# Patient Record
Sex: MALE | Race: WHITE | NOT HISPANIC OR LATINO | Employment: OTHER | URBAN - METROPOLITAN AREA
[De-identification: names, ages, dates, MRNs, and addresses within clinical notes are randomized per-mention and may not be internally consistent; named-entity substitution may affect disease eponyms.]

---

## 2017-04-24 ENCOUNTER — HISTORICAL (OUTPATIENT)
Dept: LAB | Facility: HOSPITAL | Age: 57
End: 2017-04-24

## 2018-03-15 ENCOUNTER — HISTORICAL (OUTPATIENT)
Dept: LAB | Facility: HOSPITAL | Age: 58
End: 2018-03-15

## 2018-05-14 ENCOUNTER — HISTORICAL (OUTPATIENT)
Dept: ADMINISTRATIVE | Facility: HOSPITAL | Age: 58
End: 2018-05-14

## 2019-06-20 ENCOUNTER — HISTORICAL (OUTPATIENT)
Dept: ADMINISTRATIVE | Facility: HOSPITAL | Age: 59
End: 2019-06-20

## 2019-06-20 LAB
ALBUMIN SERPL-MCNC: 3.9 GM/DL (ref 3.4–5)
ALBUMIN/GLOB SERPL: 1.56 {RATIO} (ref 1.5–2.5)
ALP SERPL-CCNC: 53 UNIT/L (ref 38–126)
ALT SERPL-CCNC: 13 UNIT/L (ref 7–52)
APPEARANCE, UA: ABNORMAL
AST SERPL-CCNC: 15 UNIT/L (ref 15–37)
BACTERIA #/AREA URNS AUTO: ABNORMAL /HPF
BILIRUB SERPL-MCNC: 0.7 MG/DL (ref 0.2–1)
BILIRUB UR QL STRIP: NEGATIVE MG/DL
BILIRUBIN DIRECT+TOT PNL SERPL-MCNC: 0.2 MG/DL (ref 0–0.5)
BILIRUBIN DIRECT+TOT PNL SERPL-MCNC: 0.5 MG/DL
BUN SERPL-MCNC: 11 MG/DL (ref 7–18)
CALCIUM SERPL-MCNC: 8.8 MG/DL (ref 8.5–10)
CHLORIDE SERPL-SCNC: 105 MMOL/L (ref 98–107)
CHOLEST SERPL-MCNC: 136 MG/DL (ref 0–200)
CHOLEST/HDLC SERPL: 2.8 {RATIO}
CO2 SERPL-SCNC: 30 MMOL/L (ref 21–32)
COLOR UR: YELLOW
CREAT SERPL-MCNC: 0.58 MG/DL (ref 0.6–1.3)
DEPRECATED CALCIDIOL+CALCIFEROL SERPL-MC: 46.7 NG/ML (ref 30–80)
EST. AVERAGE GLUCOSE BLD GHB EST-MCNC: 114 MG/DL
GLOBULIN SER-MCNC: 2.5 GM/DL (ref 1.2–3)
GLUCOSE (UA): NEGATIVE MG/DL
GLUCOSE SERPL-MCNC: 115 MG/DL (ref 74–106)
HBA1C MFR BLD: 5.6 % (ref 4.4–6.4)
HDLC SERPL-MCNC: 48 MG/DL (ref 35–60)
HGB UR QL STRIP: NEGATIVE UNIT/L
KETONES UR QL STRIP: NEGATIVE MG/DL
LDLC SERPL CALC-MCNC: 77 MG/DL (ref 0–129)
LEUKOCYTE ESTERASE UR QL STRIP: NEGATIVE UNIT/L
NITRITE UR QL STRIP.AUTO: NEGATIVE
PH UR STRIP: 6.5 [PH]
POTASSIUM SERPL-SCNC: 4 MMOL/L (ref 3.5–5.1)
PROT SERPL-MCNC: 6.4 GM/DL (ref 6.4–8.2)
PROT UR QL STRIP: NEGATIVE MG/DL
RBC #/AREA URNS HPF: ABNORMAL /HPF
SODIUM SERPL-SCNC: 140 MMOL/L (ref 136–145)
SP GR UR STRIP: 1.02
SQUAMOUS EPITHELIAL, UA: ABNORMAL /LPF
TRIGL SERPL-MCNC: 60 MG/DL (ref 30–150)
TSH SERPL-ACNC: 1.2 MIU/ML (ref 0.35–4.94)
UROBILINOGEN UR STRIP-ACNC: 1 MG/DL
VLDLC SERPL CALC-MCNC: 12 MG/DL
WBC #/AREA URNS AUTO: ABNORMAL /[HPF]

## 2022-04-07 ENCOUNTER — HISTORICAL (OUTPATIENT)
Dept: ADMINISTRATIVE | Facility: HOSPITAL | Age: 62
End: 2022-04-07

## 2022-04-24 VITALS
BODY MASS INDEX: 44.1 KG/M2 | HEIGHT: 71 IN | OXYGEN SATURATION: 92 % | DIASTOLIC BLOOD PRESSURE: 68 MMHG | SYSTOLIC BLOOD PRESSURE: 146 MMHG | WEIGHT: 315 LBS

## 2022-05-01 NOTE — HISTORICAL [facility] CERNER
This is a historical note converted from Silvia. Formatting and pictures may have been removed.  Please reference Silvia for original formatting and attached multimedia. Chief Complaint  6 month recheck  History of Present Illness  58 year old Morbidly obese WM presents for 6 month recheck  PMH: Morbid Obesity (BMI 57.16)- s/p Gastric Sleeve, HLD, HTN, DM, OA, Hx PUD,???? ?DDD(L-spine)  ?   , Disabled  No Tob, No EtOH  ?   Colonoscopy (2017) with Dr Bangura  ?   Patient still with lower back pain, although much improved since having LESIs last year and undergoing a year of PT. However, he is still having to sleep in his chair at night. He can not lie flat as his legs start hurting and burning. Has also tried using a wedge under his legs which didnt help.  Review of Systems  Constitutional:?no fever, fatigue, weakness  Eye:?no vision loss, eye redness, drainage, or pain  ENMT:?no sore throat, ear pain, sinus pain/congestion, nasal congestion/drainage  Respiratory:?no cough, no wheezing, no shortness of breath  Cardiovascular:?no chest pain, no palpitations, no edema  Gastrointestinal:?no nausea, vomiting, or diarrhea. No abdominal pain  Genitourinary:?no dysuria, no urinary frequency or urgency, no hematuria  Hema/Lymph:?no abnormal bruising or bleeding  Endocrine:?no heat or cold intolerance, no excessive thirst or excessive urination  Musculoskeletal:?chronic low back pain and LE weakness  Integumentary:?no skin rash or abnormal lesion  Neurologic: no headache, no dizziness, no weakness or numbness  ?  Physical Exam  Vitals & Measurements  T:?36.7? ?C (Oral)? HR:?86(Peripheral)? BP:?141/88? SpO2:?92%?  HT:?180?cm? WT:?178.4?kg? BMI:?55.06?  General:?well-developed well-nourished in no acute distress  Eye: PERRLA, EOMI, clear conjunctiva, eyelids normal  HENT:?TMs/ear canals clear, oropharynx without erythema/exudate, oropharynx and nasal mucosal surfaces moist, no maxillary/frontal sinus tenderness to  palpation  Neck: full range of motion, no thyromegaly or lymphadenopathy  Respiratory:?clear to auscultation bilaterally  Cardiovascular:?regular rate and rhythm without murmurs, gallops or rubs  Gastrointestinal:?soft, non-tender, non-distended with normal bowel sounds, without masses to palpation  Genitourinary: no CVA tenderness to palpation  Musculoskeletal:?Limited ROM to bilateral knees  Integumentary: no rashes or skin lesions present  Neurologic: cranial nerves intact, no signs of peripheral neurological deficit, motor/sensory function intact  ?  Assessment/Plan  1.?Degenerative disc disease?M51.9  ?s/p LESI with Dr Mcneil. Received 4 shots. Most recent 8/2018  ?Also saw Neurosurgeon (Iban Meyer @ [facility]). Offered surgery (L2-L5 fusion) but would rather hold off if necessary and continue with therapy  Ordered:  Lab Collection Request, 06/20/19 9:39:00 CDT, [facility] AM[facility] - [facility], 06/20/19 9:39:00 CDT  Office/Outpatient Visit Level 4 Established 19427 PC, GERD - Gastro-esophageal reflux disease  Hypertension  Lumbar radiculopathy  Type 2 diabetes mellitus  Degenerative disc disease, [facility] - [facility], 06/20/19 9:39:00 CDT  ?  2.?GERD - Gastro-esophageal reflux disease?K21.9  Continue Omeprazole 40mg PO q day  Ordered:  Office/Outpatient Visit Level 4 Established 89166 PC, GERD - Gastro-esophageal reflux disease  Hypertension  Lumbar radiculopathy  Type 2 diabetes mellitus  Degenerative disc disease, [facility] - [facility], 06/20/19 9:39:00 CDT  ?  3.?Hypertension?I10  Continue?Valsartan/HCTZ 160/25mg PO q day  Ordered:  Comprehensive Metabolic Panel, Now collect, 06/20/19 9:39:00 CDT, Blood, Order for future visit, Stop date 06/20/19 9:39:00 CDT, Lab Collect, Hypertension, 06/20/19 9:39:00 CDT  Office/Outpatient Visit Level 4 Established 98939 PC, GERD - Gastro-esophageal reflux disease  Hypertension  Lumbar radiculopathy  Type 2 diabetes mellitus  Degenerative disc disease, [facility] AM[facility]  - AFP, 06/20/19 9:39:00 CDT  ?  4.?Lumbar radiculopathy?M54.16  ?Rx given for Hospital bed  Ordered:  Office/Outpatient Visit Level 4 Established 10412 PC, GERD - Gastro-esophageal reflux disease  Hypertension  Lumbar radiculopathy  Type 2 diabetes mellitus  Degenerative disc disease, [facility] - [facility], 06/20/19 9:39:00 CDT  ?  5.?Obstructive sleep apnea of adult?G47.33  Continue with CPAP  Patient compliant and wears mask nightly. Symptoms improved  ?  6.?Type 2 diabetes mellitus?E11.9  ?Continue Metformin 500mg PO BID  ?CMP, A1C  Ordered:  Hemoglobin A1c, Routine collect, 06/20/19 9:39:00 CDT, Blood, Order for future visit, Stop date 06/20/19 9:39:00 CDT, Lab Collect, Type 2 diabetes mellitus, 06/20/19 9:39:00 CDT  Office/Outpatient Visit Level 4 Established 95012 PC, GERD - Gastro-esophageal reflux disease  Hypertension  Lumbar radiculopathy  Type 2 diabetes mellitus  Degenerative disc disease, [facility] AM[facility] - [facility], 06/20/19 9:39:00 CDT  ?  7.?HLD (hyperlipidemia)?E78.5  ?Continue Atorvastatin 80mg PO q day  ?FLP  Ordered:  Lipid Panel, Routine collect, 06/20/19 9:39:00 CDT, Blood, Order for future visit, Stop date 06/20/19 9:39:00 CDT, Lab Collect, HLD (hyperlipidemia), 06/20/19 9:39:00 CDT  Thyroid Stimulating Hormone, Routine collect, 06/20/19 9:39:00 CDT, Blood, Order for future visit, Stop date 06/20/19 9:39:00 CDT, Lab Collect, HLD (hyperlipidemia), 06/20/19 9:39:00 CDT  ?   Problem List/Past Medical History  Ongoing  Degenerative disc disease  GERD - Gastro-esophageal reflux disease  Hypertension  Impingement syndrome of right shoulder  Lumbar radiculopathy  Obesity  Obstructive sleep apnea of adult  Osteoarthritis  Type 2 diabetes mellitus  Historical  Avenel palsy  Diabetes  Hemorrhoid  Meningitis  Prostatitis  Renal failure  Restless leg syndrome  Stroke  Procedure/Surgical History  Colonoscopy (04/27/2018)  Gastric Sleeve Laparoscopic (.) (09/15/2015)  Laparoscopic Vertical (Sleeve) Gastrectomy  (09/15/2015)  Colonoscopy (07/09/2013)  Colonoscopy (07/09/2013)  Other endoscopy of small intestine (07/08/2013)  Flexible sigmoidoscopy (07/05/2013)  Other endoscopy of small intestine (07/05/2013)  Venous catheterization, not elsewhere classified (07/05/2013)  double hernia repair  hemorrhoid banding  tonsillectomy   Medications  Anusol-HC 25 mg rectal suppository, 25 mg= 1 supp, TN (rectal), BID  atorvastatin 80 mg oral tablet, 80 mg= 1 tab(s), Oral, Daily  celecoxib 100 mg oral capsule, 100 mg= 1 cap(s), Oral, BID, 1 refills  Klor-Con M20 oral tablet, extended release, 20 mEq= 1 tab(s), Oral, Daily, 1 refills  metformin 500 mg oral tablet, 500 mg= 1 tab(s), Oral, BID, 1 refills  omeprazole 40 mg oral DR capsule, 40 mg= 1 cap(s), Oral, BID, 2 refills  VALSARTAN-HCTZ 160-25 MG TAB, 1 tab(s), Oral, Daily  Vitamin D 1000 intl units oral tablet, 1 tab, Oral, Daily  Vitamin D 50,000 intl units oral capsule, 37956 IntUnit= 1 cap(s), Oral, qMon-Sa  Allergies  No Known Allergies  Social History  Abuse/Neglect  No, 06/20/2019  Alcohol - Denies Alcohol Use, 07/03/2013  Past, 03/15/2018  Employment/School - Not employed or in school, 07/03/2013  Unemployed, 12/20/2018  Exercise - Regular exercise, 07/03/2013  Home/Environment - No Risk, 07/03/2013  Lives with Spouse., 12/20/2018  Nutrition/Health  Regular, 12/20/2018  Substance Use - Low Risk, 07/03/2013  Tobacco - Denies Tobacco Use, 07/03/2013  Never (less than 100 in lifetime), No, 06/20/2019  Former smoker, quit more than 30 days ago, N/A, 04/05/2019  Former smoker, quit more than 30 days ago, N/A, 01/10/2019  Family History  Alcoholism.: Mother and Father.  Asthma.: Mother.  Hypertension.: Father.  Primary malignant neoplasm of colon: Mother and Father.  Skin cancer: Father.  Immunizations  Vaccine Date Status   pneumococcal 13-valent conjugate vaccine 09/26/2018 Given   influenza virus vaccine, inactivated 09/26/2018 Given   [facility]  Maintenance  ???Pending?(in the next year)  ??? ??Due?  ??? ? ? ?Aspirin Therapy for CVD Prevention due??06/20/19??and every 1??year(s)  ??? ? ? ?Diabetes Maintenance-Eye Exam due??06/20/19??and every?  ??? ? ? ?Diabetes Maintenance-Foot Exam due??06/20/19??and every?  ??? ? ? ?Tetanus Vaccine due??06/20/19??and every 10??year(s)  ??? ??Due In Future?  ??? ? ? ?Diabetes Maintenance-Fasting Lipid Profile not due until??12/20/19??and every 1??year(s)  ??? ? ? ?Diabetes Maintenance-HgbA1c not due until??12/20/19??and every 1??year(s)  ??? ? ? ?Hypertension Management-BMP not due until??12/20/19??and every 1??year(s)  ??? ? ? ?Diabetes Maintenance-Serum Creatinine not due until??12/20/19??and every 1??year(s)  ??? ? ? ?Diabetes Maintenance-Urine Dipstick not due until??12/20/19??and every 1??year(s)  ??? ? ? ?Diabetes Maintenance-Microalbumin not due until??12/20/19??and every 1??year(s)  ??? ? ? ?Alcohol Misuse Screening not due until??01/01/20??and every 1??year(s)  ??? ? ? ?Obesity Screening not due until??01/01/20??and every 1??year(s)  ??? ? ? ?Depression Screening not due until??04/04/20??and every 1??year(s)  ??? ? ? ?ADL Screening not due until??04/05/20??and every 1??year(s)  ??? ? ? ?Blood Pressure Screening not due until??06/19/20??and every 1??year(s)  ??? ? ? ?Body Mass Index Check not due until??06/19/20??and every 1??year(s)  ??? ? ? ?Hypertension Management-Blood Pressure not due until??06/19/20??and every 1??year(s)  ???Satisfied?(in the past 1 year)  ??? ??Satisfied?  ??? ? ? ?ADL Screening on??04/05/19.??Satisfied by Khadra Melgar LPN  ??? ? ? ?Alcohol Misuse Screening on??04/05/19.??Satisfied by Khadra Melgar LPN  ??? ? ? ?Blood Pressure Screening on??06/20/19.??Satisfied by Daija Cullen CMA  ??? ? ? ?Body Mass Index Check on??06/20/19.??Satisfied by Daija Cullen CMA  ??? ? ? ?Depression Screening on??04/05/19.??Satisfied by Khadra Melgar LPN  ??? ? ? ?Diabetes  Maintenance-Microalbumin on??12/20/18.??Satisfied by [place]  ??? ? ? ?Diabetes Maintenance-Fasting Lipid Profile on??12/20/18.??Satisfied by Alysha Rodriguez  ??? ? ? ?Diabetes Maintenance-HgbA1c on??12/20/18.??Satisfied by Alysha Rodriguez  ??? ? ? ?Diabetes Maintenance-Serum Creatinine on??12/20/18.??Satisfied by Alysha Rodriguez  ??? ? ? ?Diabetes Maintenance-Urine Dipstick on??12/20/18.??Satisfied by [place]  ??? ? ? ?Diabetes Screening on??12/20/18.??Satisfied by Alysha Rodriguez  ??? ? ? ?Hypertension Management-Blood Pressure on??06/20/19.??Satisfied by Daija Cullen CMA.  ??? ? ? ?Hypertension Management-BMP on??12/20/18.??Satisfied by [place]  ??? ? ? ?Influenza Vaccine on??09/26/18.??Satisfied by Khadra Meglar LPN  ??? ? ? ?Lipid Screening on??12/20/18.??Satisfied by Alysha Rodriguez  ??? ? ? ?Obesity Screening on??06/20/19.??Satisfied by Daija Cullen CMA.  ?  ?      Morbid Obesity with inability to lie flat due to lower back issues.  Therefore, patient will need an Electronic Hospital Bed  Due to his excessive wt, he also needs a new shower chair as his current chair does not support his wt limit. He will require a Standard Heavy Duty Shower cahir with 500lb max  Due to obesity and back issues, he also has difficulty bending over in shower to properly clean perineum. Therefore, he would benefit from [facility] Potty cover seat

## 2022-05-26 PROBLEM — G89.29 BILATERAL CHRONIC KNEE PAIN: Status: ACTIVE | Noted: 2022-05-26

## 2022-05-26 PROBLEM — G56.00 CTS (CARPAL TUNNEL SYNDROME): Status: ACTIVE | Noted: 2022-05-26

## 2022-05-26 PROBLEM — R73.03 PREDIABETES: Status: ACTIVE | Noted: 2022-05-26

## 2022-05-26 PROBLEM — M75.40 IMPINGEMENT SYNDROME OF SHOULDER REGION: Status: ACTIVE | Noted: 2022-05-26

## 2022-05-26 PROBLEM — D50.9 IRON DEFICIENCY ANEMIA: Status: ACTIVE | Noted: 2022-05-26

## 2022-05-26 PROBLEM — K21.9 GASTROESOPHAGEAL REFLUX DISEASE: Status: ACTIVE | Noted: 2022-05-26

## 2022-05-26 PROBLEM — M19.90 OSTEOARTHRITIS: Status: ACTIVE | Noted: 2022-05-26

## 2022-05-26 PROBLEM — M25.562 BILATERAL CHRONIC KNEE PAIN: Status: ACTIVE | Noted: 2022-05-26

## 2022-05-26 PROBLEM — E55.9 VITAMIN D DEFICIENCY: Status: ACTIVE | Noted: 2022-05-26

## 2022-05-26 PROBLEM — Z12.5 PROSTATE CANCER SCREENING: Status: ACTIVE | Noted: 2022-05-26

## 2022-05-26 PROBLEM — G47.33 OBSTRUCTIVE SLEEP APNEA SYNDROME IN ADULT: Status: ACTIVE | Noted: 2022-05-26

## 2022-05-26 PROBLEM — E78.5 HYPERLIPIDEMIA: Status: ACTIVE | Noted: 2022-05-26

## 2022-05-26 PROBLEM — Z00.00 MEDICARE ANNUAL WELLNESS VISIT, SUBSEQUENT: Status: ACTIVE | Noted: 2022-05-26

## 2022-05-26 PROBLEM — M54.16 LUMBAR RADICULOPATHY: Status: ACTIVE | Noted: 2022-05-26

## 2022-05-26 PROBLEM — M25.561 BILATERAL CHRONIC KNEE PAIN: Status: ACTIVE | Noted: 2022-05-26

## 2022-05-26 PROBLEM — I10 HYPERTENSION: Status: ACTIVE | Noted: 2022-05-26

## 2022-05-26 PROCEDURE — 82728 ASSAY OF FERRITIN: CPT | Performed by: NURSE PRACTITIONER

## 2022-05-26 PROCEDURE — 83540 ASSAY OF IRON: CPT | Performed by: NURSE PRACTITIONER

## 2022-12-13 ENCOUNTER — PATIENT OUTREACH (OUTPATIENT)
Dept: ADMINISTRATIVE | Facility: HOSPITAL | Age: 62
End: 2022-12-13
Payer: MEDICARE

## 2023-03-20 ENCOUNTER — IMMUNIZATION (OUTPATIENT)
Dept: FAMILY MEDICINE | Facility: CLINIC | Age: 63
End: 2023-03-20
Payer: MEDICARE

## 2023-03-20 DIAGNOSIS — Z23 NEED FOR VACCINATION: Primary | ICD-10-CM

## 2023-03-20 PROCEDURE — 0124A COVID-19, MRNA, LNP-S, BIVALENT BOOSTER, PF, 30 MCG/0.3 ML DOSE: CPT | Mod: S$GLB,,, | Performed by: INTERNAL MEDICINE

## 2023-03-20 PROCEDURE — 91312 COVID-19, MRNA, LNP-S, BIVALENT BOOSTER, PF, 30 MCG/0.3 ML DOSE: CPT | Mod: S$GLB,,, | Performed by: INTERNAL MEDICINE

## 2023-03-20 PROCEDURE — 91312 COVID-19, MRNA, LNP-S, BIVALENT BOOSTER, PF, 30 MCG/0.3 ML DOSE: ICD-10-PCS | Mod: S$GLB,,, | Performed by: INTERNAL MEDICINE

## 2023-03-20 PROCEDURE — 0124A COVID-19, MRNA, LNP-S, BIVALENT BOOSTER, PF, 30 MCG/0.3 ML DOSE: ICD-10-PCS | Mod: S$GLB,,, | Performed by: INTERNAL MEDICINE

## 2023-04-12 ENCOUNTER — LAB VISIT (OUTPATIENT)
Dept: FAMILY MEDICINE | Facility: CLINIC | Age: 63
End: 2023-04-12
Payer: MEDICARE

## 2023-04-12 DIAGNOSIS — Z11.52 ENCOUNTER FOR SCREENING FOR COVID-19: Primary | ICD-10-CM

## 2023-04-12 LAB
CTP QC/QA: YES
SARS-COV-2 AG RESP QL IA.RAPID: POSITIVE

## 2023-04-12 NOTE — PROGRESS NOTES
Nasal swab collected for rapid covid test. Pt tolerated well. Notified of positive results. Pt verbalized understanding

## 2023-04-17 ENCOUNTER — LAB VISIT (OUTPATIENT)
Dept: FAMILY MEDICINE | Facility: CLINIC | Age: 63
End: 2023-04-17
Payer: MEDICARE

## 2023-04-17 DIAGNOSIS — Z11.52 ENCOUNTER FOR SCREENING FOR COVID-19: Primary | ICD-10-CM

## 2023-04-17 LAB
CTP QC/QA: YES
SARS-COV-2 AG RESP QL IA.RAPID: POSITIVE

## 2023-04-21 ENCOUNTER — LAB VISIT (OUTPATIENT)
Dept: FAMILY MEDICINE | Facility: CLINIC | Age: 63
End: 2023-04-21
Payer: MEDICARE

## 2023-04-21 DIAGNOSIS — Z11.52 ENCOUNTER FOR SCREENING FOR COVID-19: Primary | ICD-10-CM

## 2023-04-21 LAB
CTP QC/QA: YES
SARS-COV-2 AG RESP QL IA.RAPID: POSITIVE

## 2023-04-24 ENCOUNTER — LAB VISIT (OUTPATIENT)
Dept: FAMILY MEDICINE | Facility: CLINIC | Age: 63
End: 2023-04-24
Payer: MEDICARE

## 2023-04-24 DIAGNOSIS — Z11.52 ENCOUNTER FOR SCREENING FOR COVID-19: Primary | ICD-10-CM

## 2023-04-24 LAB
CTP QC/QA: YES
SARS-COV-2 AG RESP QL IA.RAPID: NEGATIVE

## 2023-04-24 PROCEDURE — 87811 SARS CORONAVIRUS 2 ANTIGEN POCT, MANUAL READ: ICD-10-PCS | Mod: QW,S$GLB,, | Performed by: INTERNAL MEDICINE

## 2023-04-24 PROCEDURE — 87811 SARS-COV-2 COVID19 W/OPTIC: CPT | Mod: QW,S$GLB,, | Performed by: INTERNAL MEDICINE

## 2023-04-24 NOTE — PROGRESS NOTES
Rapid Covid Swab performed and tolerated well. Patient waited for results and verbally understood results.

## 2023-05-05 ENCOUNTER — ANESTHESIA EVENT (OUTPATIENT)
Dept: ENDOSCOPY | Facility: HOSPITAL | Age: 63
End: 2023-05-05
Payer: MEDICARE

## 2023-05-05 RX ORDER — SODIUM CHLORIDE, SODIUM GLUCONATE, SODIUM ACETATE, POTASSIUM CHLORIDE AND MAGNESIUM CHLORIDE 30; 37; 368; 526; 502 MG/100ML; MG/100ML; MG/100ML; MG/100ML; MG/100ML
INJECTION, SOLUTION INTRAVENOUS CONTINUOUS
Status: CANCELLED | OUTPATIENT
Start: 2023-05-05 | End: 2023-06-04

## 2023-05-05 RX ORDER — SODIUM CITRATE AND CITRIC ACID MONOHYDRATE 334; 500 MG/5ML; MG/5ML
30 SOLUTION ORAL ONCE
Status: CANCELLED | OUTPATIENT
Start: 2023-05-08

## 2023-05-05 RX ORDER — FAMOTIDINE 20 MG/50ML
20 INJECTION, SOLUTION INTRAVENOUS ONCE
Status: CANCELLED | OUTPATIENT
Start: 2023-05-08

## 2023-05-05 RX ORDER — ONDANSETRON 2 MG/ML
4 INJECTION INTRAMUSCULAR; INTRAVENOUS ONCE AS NEEDED
Status: CANCELLED | OUTPATIENT
Start: 2023-05-05 | End: 2034-10-01

## 2023-05-05 NOTE — ANESTHESIA PREPROCEDURE EVALUATION
05/05/2023  Stalin Perkins is a 63 y.o., male here for colon screen  Pre-operative evaluation for Procedure(s) (LRB):  COLON (N/A)    Stalin Perkins is a 63 y.o. male     Patient Active Problem List   Diagnosis    Degeneration of intervertebral disc    Gastroesophageal reflux disease    Hyperlipidemia    Hypertension    Impingement syndrome of shoulder region    Lumbar radiculopathy    Obstructive sleep apnea syndrome    Osteoarthritis    Prediabetes    Vitamin D deficiency    Prostate cancer screening    CTS (carpal tunnel syndrome)    Iron deficiency anemia    Bilateral chronic knee pain       Review of patient's allergies indicates:  No Known Allergies    No current facility-administered medications on file prior to encounter.     Current Outpatient Medications on File Prior to Encounter   Medication Sig Dispense Refill    albuterol (PROAIR HFA) 90 mcg/actuation inhaler Inhale 2 puffs into the lungs every 6 (six) hours as needed for Wheezing. Rescue 18 g 1    albuterol-ipratropium (DUO-NEB) 2.5 mg-0.5 mg/3 mL nebulizer solution Take 3 mLs by nebulization every 6 (six) hours as needed for Wheezing. Rescue 100 each 1    atorvastatin (LIPITOR) 80 MG tablet Take 80 mg by mouth once daily.      b complex vitamins tablet Take 1 tablet by mouth once daily.      blood sugar diagnostic Strp To check BG TID PRN, to use with OneTouch glucometer 100 each 3    carvediloL (COREG) 6.25 MG tablet Take 1 tablet (6.25 mg total) by mouth 2 (two) times a day. 180 tablet 1    cyanocobalamin (VITAMIN B-12) 100 MCG tablet Take 100 mcg by mouth once daily.      ergocalciferol (ERGOCALCIFEROL) 50,000 unit Cap Take 1 capsule (50,000 Units total) by mouth twice a week. 24 capsule 1    Lactobacillus acidophilus (PROBIOTIC) 10 billion cell Cap Take by mouth.      lancets Misc To check BG TID  PRN, to use with OneTouch glucometer 100 each 3    metFORMIN (GLUCOPHAGE) 500 MG tablet Take 1 tablet (500 mg total) by mouth 2 (two) times a day. 180 tablet 3    mineral oil/petrolatum,white (LACRI-LUBE S.O.P. OPHT) Apply 1 drop to eye 4 (four) times daily as needed.      mupirocin (BACTROBAN) 2 % ointment Apply topically 3 (three) times daily. 30 g 2    omeprazole (PRILOSEC) 40 MG capsule Take 1 capsule (40 mg total) by mouth every morning. 90 capsule 3    polyethylene glycol (GLYCOLAX) 17 gram/dose powder Take 17 g by mouth once daily.      potassium chloride SA (K-DUR,KLOR-CON) 20 MEQ tablet Take 20 mEq by mouth once daily.      silver sulfADIAZINE 1% (SILVADENE) 1 % cream Apply 1 g topically 2 (two) times daily. 85 g 2    valsartan-hydrochlorothiazide (DIOVAN-HCT) 320-25 mg per tablet Take 1 tablet by mouth Daily. 90 tablet 3       Past Surgical History:   Procedure Laterality Date    BAND HEMORRHOIDECTOMY      COLONOSCOPY  09/03/2019    FLEXIBLE SIGMOIDOSCOPY  07/05/2013    HERNIA REPAIR      LAPAROSCOPIC SLEEVE GASTRECTOMY  09/15/2015    TONSILLECTOMY, ADENOIDECTOMY           CBC 4/29/2023: H/H=14/42      No results for input(s): WBC, RBC, HGB, HCT, PLT, MCV, MCH, MCHC in the last 72 hours.    CMP: WNL     No results for input(s): NA, K, CL, CO2, BUN, CREATININE, GLU, MG, PHOS, CALCIUM, ALBUMIN, PROT, ALKPHOS, ALT, AST, BILITOT in the last 72 hours.    INR  No results for input(s): PT, INR, PROTIME, APTT in the last 72 hours.        Diagnostic Studies:  CXR :  EKG :  2D Echo :  No results found for this or any previous visit.      Pre-op Assessment    I have reviewed the Patient Summary Reports.     I have reviewed the Nursing Notes. I have reviewed the NPO Status.   I have reviewed the Medications.     Review of Systems  Anesthesia Hx:  No problems with previous Anesthesia  History of prior surgery of interest to airway management or planning: Previous anesthesia: General Lap Gastric Sleeve: ;  T7A:; Hernia Repair:; Band Hemorrhoidectoym: ; Colonoscopy with general anesthesia.  Airway issues documented on chart review include GETA  Denies Family Hx of Anesthesia complications.   Denies Personal Hx of Anesthesia complications.   Social:  No Alcohol Use, Former Smoker Quit Tob:    Hematology/Oncology:     Oncology Normal    -- Anemia:   EENT/Dental:EENT/Dental Normal   Cardiovascular:   Denies Pacemaker. Hypertension, poorly controlled  Denies CABG/stent.   Denies Angina. hyperlipidemia    Pulmonary:   Asthma Sleep Apnea Compliant BiPAP:   DuoNeb: ; ProAir:  Last asthma: 6 mos ago. Resolves immed w ProAir. Used 3 weeks ago bc of Covid not hosp.  Covid Infxn 4/12/2023.   Renal/:  Renal/ Normal     Hepatic/GI:   GERD, well controlled Diverticulosis.  Chr Omeprazole. Stopped 5/5/2023.   Musculoskeletal:   Arthritis  With Lumbar radiculopathy. Impinge Shoulder Syndrome. Spine Disorders: lumbar Degenerative disease and Chronic Pain    Neurological:   CVA, no residual symptoms Neuromuscular Disease, Denies Seizures. 1976 CVA: resolved.  Grove Palsy x2.   Peripheral Neuropathy    Endocrine:   Diabetes  Morbid Obesity / BMI > 40  Dermatological:  Skin Normal    Psych:  Psychiatric Normal           Physical Exam  General: Cooperative, Alert and Oriented  Walks w walker, cane. Motorized  WC at home.  Airway:  Mallampati: III / II  Mouth Opening: Normal  TM Distance: > 6 cm  Tongue: Normal  Neck ROM: Normal ROM    Dental:  Intact  Px denies any loose teeth.      Anesthesia Plan  Type of Anesthesia, risks & benefits discussed:    Anesthesia Type: Gen Natural Airway  Intra-op Monitoring Plan: Standard ASA Monitors  Induction:  IV  Informed Consent: Informed consent signed with the Patient and all parties understand the risks and agree with anesthesia plan.  All questions answered.   ASA Score: 3  Day of Surgery Review of History & Physical: H&P Update referred to the surgeon/provider.I have interviewed and examined  the patient. I have reviewed the patient's H&P dated:   Anesthesia Plan Notes: TIVA with mask/NC, GA back-up.     Ready For Surgery From Anesthesia Perspective.     .

## 2023-05-08 ENCOUNTER — HOSPITAL ENCOUNTER (OUTPATIENT)
Facility: HOSPITAL | Age: 63
Discharge: HOME | End: 2023-05-08
Attending: INTERNAL MEDICINE | Admitting: INTERNAL MEDICINE
Payer: MEDICARE

## 2023-05-08 ENCOUNTER — ANESTHESIA (OUTPATIENT)
Dept: ENDOSCOPY | Facility: HOSPITAL | Age: 63
End: 2023-05-08
Payer: MEDICARE

## 2023-05-08 VITALS
TEMPERATURE: 97 F | OXYGEN SATURATION: 96 % | BODY MASS INDEX: 42.66 KG/M2 | WEIGHT: 315 LBS | HEIGHT: 72 IN | HEART RATE: 74 BPM | SYSTOLIC BLOOD PRESSURE: 126 MMHG | RESPIRATION RATE: 22 BRPM | DIASTOLIC BLOOD PRESSURE: 73 MMHG

## 2023-05-08 DIAGNOSIS — Z86.010 PERSONAL HISTORY OF COLONIC POLYPS: ICD-10-CM

## 2023-05-08 DIAGNOSIS — Z12.11 SCREEN FOR COLON CANCER: ICD-10-CM

## 2023-05-08 DIAGNOSIS — Z80.0 FAMILY HISTORY OF COLON CANCER: ICD-10-CM

## 2023-05-08 LAB — POCT GLUCOSE: 103 MG/DL (ref 70–110)

## 2023-05-08 PROCEDURE — 37000009 HC ANESTHESIA EA ADD 15 MINS: Performed by: INTERNAL MEDICINE

## 2023-05-08 PROCEDURE — 88305 TISSUE EXAM BY PATHOLOGIST: CPT | Performed by: INTERNAL MEDICINE

## 2023-05-08 PROCEDURE — D9220A PRA ANESTHESIA: ICD-10-PCS | Mod: PT,CRNA,,

## 2023-05-08 PROCEDURE — 45385 COLONOSCOPY W/LESION REMOVAL: CPT | Mod: PT | Performed by: INTERNAL MEDICINE

## 2023-05-08 PROCEDURE — 63600175 PHARM REV CODE 636 W HCPCS

## 2023-05-08 PROCEDURE — 25000003 PHARM REV CODE 250

## 2023-05-08 PROCEDURE — D9220A PRA ANESTHESIA: Mod: PT,CRNA,,

## 2023-05-08 PROCEDURE — 27201423 OPTIME MED/SURG SUP & DEVICES STERILE SUPPLY: Performed by: INTERNAL MEDICINE

## 2023-05-08 PROCEDURE — D9220A PRA ANESTHESIA: Mod: PT,ANES,, | Performed by: ANESTHESIOLOGY

## 2023-05-08 PROCEDURE — D9220A PRA ANESTHESIA: ICD-10-PCS | Mod: PT,ANES,, | Performed by: ANESTHESIOLOGY

## 2023-05-08 PROCEDURE — 37000008 HC ANESTHESIA 1ST 15 MINUTES: Performed by: INTERNAL MEDICINE

## 2023-05-08 RX ORDER — PROPOFOL 10 MG/ML
VIAL (ML) INTRAVENOUS CONTINUOUS PRN
Status: DISCONTINUED | OUTPATIENT
Start: 2023-05-08 | End: 2023-05-08

## 2023-05-08 RX ORDER — PROPOFOL 10 MG/ML
VIAL (ML) INTRAVENOUS
Status: COMPLETED
Start: 2023-05-08 | End: 2023-05-08

## 2023-05-08 RX ORDER — PROPOFOL 10 MG/ML
VIAL (ML) INTRAVENOUS
Status: DISCONTINUED | OUTPATIENT
Start: 2023-05-08 | End: 2023-05-08

## 2023-05-08 RX ORDER — LIDOCAINE HYDROCHLORIDE 20 MG/ML
INJECTION, SOLUTION EPIDURAL; INFILTRATION; INTRACAUDAL; PERINEURAL
Status: DISCONTINUED | OUTPATIENT
Start: 2023-05-08 | End: 2023-05-08

## 2023-05-08 RX ORDER — LIDOCAINE HYDROCHLORIDE 20 MG/ML
INJECTION, SOLUTION EPIDURAL; INFILTRATION; INTRACAUDAL; PERINEURAL
Status: COMPLETED
Start: 2023-05-08 | End: 2023-05-08

## 2023-05-08 RX ADMIN — Medication 125 MCG/KG/MIN: at 07:05

## 2023-05-08 RX ADMIN — LIDOCAINE HYDROCHLORIDE 5 ML: 20 INJECTION, SOLUTION INTRAVENOUS at 07:05

## 2023-05-08 RX ADMIN — SODIUM CHLORIDE: 9 INJECTION, SOLUTION INTRAVENOUS at 07:05

## 2023-05-08 RX ADMIN — PROPOFOL 100 MG: 10 INJECTION, EMULSION INTRAVENOUS at 07:05

## 2023-05-08 NOTE — DISCHARGE SUMMARY
[facility]  Discharge Note  Short Stay    Procedure(s) (LRB):  COLON (N/A)  COLONOSCOPY, WITH POLYPECTOMY USING SNARE      OUTCOME: Patient tolerated treatment/procedure well without complication and is now ready for discharge.    DISPOSITION: Home or Self Care    FINAL DIAGNOSIS:  <principal problem not specified>    FOLLOWUP: With primary care provider    DISCHARGE INSTRUCTIONS:  No discharge procedures on file.     TIME SPENT ON DISCHARGE: 15 minutes

## 2023-05-08 NOTE — PROVATION PATIENT INSTRUCTIONS
Discharge Summary/Instructions after an Endoscopic Procedure  Patient Name: Stalin Perkins  Patient MRN: [id number]  Patient YOB: 1960  Monday, May 8, 2023  Deepa Mcgrath III, MD  Dear patient,  As a result of recent federal legislation (The Federal Cures Act), you may   receive lab or pathology results from your procedure in your [facility]   account before your physician is able to contact you. Your physician or   their representative will relay the results to you with their   recommendations at their soonest availability.  Thank you,  RESTRICTIONS:  During your procedure today, you received medications for sedation.  These   medications may affect your judgment, balance and coordination.  Therefore,   for 24 hours, you have the following restrictions:   - DO NOT drive a car, operate machinery, make legal/financial decisions,   sign important papers or drink alcohol.    ACTIVITY:  Today: no heavy lifting, straining or running due to procedural   sedation/anesthesia.  The following day: return to full activity including work.  DIET:  Eat and drink normally unless instructed otherwise.     TREATMENT FOR COMMON SIDE EFFECTS:  - Mild abdominal pain, nausea, belching, bloating or excessive gas:  rest,   eat lightly and use a heating pad.  - Sore Throat: treat with throat lozenges and/or gargle with warm salt   water.  - Because air was used during the procedure, expelling large amounts of air   from your rectum or belching is normal.  - If a bowel prep was taken, you may not have a bowel movement for 1-3 days.    This is normal.  SYMPTOMS TO WATCH FOR AND REPORT TO YOUR PHYSICIAN:  1. Abdominal pain or bloating, other than gas cramps.  2. Chest pain.  3. Back pain.  4. Signs of infection such as: chills or fever occurring within 24 hours   after the procedure.  5. Rectal bleeding, which would show as bright red, maroon, or black stools.   (A tablespoon of blood from the rectum is not serious, especially  if   hemorrhoids are present.)  6. Vomiting.  7. Weakness or dizziness.  GO DIRECTLY TO THE NEAREST EMERGENCY ROOM IF YOU HAVE ANY OF THE FOLLOWING:      Difficulty breathing              Chills and/or fever over 101 F   Persistent vomiting and/or vomiting blood   Severe abdominal pain   Severe chest pain   Black, tarry stools   Bleeding- more than one tablespoon   Any other symptom or condition that you feel may need urgent attention  Your doctor recommends these additional instructions:  If any biopsies were taken, your doctors clinic will contact you in 1 to 2   weeks with any results.  - Repeat colonoscopy for adenoma surveillance.   - Patient has a contact number available for emergencies.  The signs and   symptoms of potential delayed complications were discussed with the   patient.  Return to normal activities tomorrow.  Written discharge   instructions were provided to the patient.   - Discharge patient to home (ambulatory).  For questions, problems or results please call your physician - Deepa Mcgrath III, MD at Work:  [phone].  [facility], EMERGENCY ROOM PHONE NUMBER: [phone]  IF A COMPLICATION OR EMERGENCY SITUATION ARISES AND YOU ARE UNABLE TO REACH   YOUR PHYSICIAN - GO DIRECTLY TO THE EMERGENCY ROOM.  Deepa Mcgrath III, MD  5/8/2023 7:40:06 AM  This report has been verified and signed electronically.  Dear patient,  As a result of recent federal legislation (The Federal Cures Act), you may   receive lab or pathology results from your procedure in your [facility]   account before your physician is able to contact you. Your physician or   their representative will relay the results to you with their   recommendations at their soonest availability.  Thank you,  PROVATION

## 2023-05-08 NOTE — ANESTHESIA POSTPROCEDURE EVALUATION
Anesthesia Post Evaluation    Patient: tSalin Perkins    Procedure(s) Performed: Procedure(s) (LRB):  COLON (N/A)  COLONOSCOPY, WITH POLYPECTOMY USING SNARE    Final Anesthesia Type: general (-TIVA w Natural AW)      Patient location during evaluation: GI PACU  Patient participation: Yes- Able to Participate  Level of consciousness: awake and alert, awake and oriented  Post-procedure vital signs: reviewed and stable  Pain management: adequate  Airway patency: patent    PONV status at discharge: No PONV  Anesthetic complications: no      Cardiovascular status: blood pressure returned to baseline, hemodynamically stable and stable  Respiratory status: unassisted, spontaneous ventilation and room air  Hydration status: euvolemic  Follow-up not needed.          Vitals Value Taken Time   /73 05/08/23 0755   Temp ** 05/08/23 1152   Pulse 74 05/08/23 0755   Resp 22 05/08/23 0755   SpO2 96 % 05/08/23 0755         No case tracking events are documented in the log.      Pain/Inés Score: Inés Score: 10 (5/8/2023  7:55 AM)

## 2023-05-08 NOTE — TRANSFER OF CARE
"Anesthesia Transfer of Care Note    Patient: Stalin Perkins    Procedure(s) Performed: Procedure(s) (LRB):  COLON (N/A)  COLONOSCOPY, WITH POLYPECTOMY USING SNARE    Patient location: GI    Anesthesia Type: general and MAC    Transport from OR: Transported from OR on 2-3 L/min O2 by NC with adequate spontaneous ventilation    Post pain: adequate analgesia    Post assessment: no apparent anesthetic complications    Post vital signs: stable    Level of consciousness: responds to stimulation    Nausea/Vomiting: no nausea/vomiting    Complications: none    Transfer of care protocol was followedComments: Detailed report with handoff to licensed provider complete      Last vitals:   Visit Vitals  BP (!) 172/91   Pulse 76   Temp 36.1 °C (97 °F)   Resp 16   Ht 5' 11.5" (1.816 m)   Wt (!) 204.1 kg (450 lb)   SpO2 98%   BMI 61.89 kg/m²     "

## 2023-05-08 NOTE — H&P
Endoscopy History and Physical    PCP - Delmar Moss MD    Procedure - Colonoscopy  Sedation: MAC  ASA - per anesthesia  Mallampati - per anesthesia  History of Anesthesia problems - no  Family history Anesthesia problems -  no     HPI:  This is a 63 y.o. male here for evaluation of fam hx of colon cancer - father. No abd pain, change in BM, bleeding.      Reflux - no  Dysphagia - no  Abdominal pain - no  Diarrhea - no    ROS:  Constitutional: No fevers, chills, No weight loss  ENT: No allergies  CV: No chest pain  Pulm: No cough, No shortness of breath  Ophtho: No vision changes  GI: see HPI  Medical History:  has a past medical history of Chronic asthma, mild intermittent, with acute exacerbation, Chronic GERD, DDD (degenerative disc disease), lumbar, Family history of colon cancer, High Risk Screening Colon, HLD (hyperlipidemia), HTN (hypertension), benign, KATE treated with BiPAP, Personal history of colonic polyps, Restless legs syndrome (RLS), Stroke, Type 2 diabetes mellitus without complication, without long-term current use of insulin, and Vitamin D deficiency.    Surgical History:  has a past surgical history that includes TONSILLECTOMY, ADENOIDECTOMY; Hernia repair; Flexible sigmoidoscopy (07/05/2013); Colonoscopy (09/03/2019); Band hemorrhoidectomy; and Laparoscopic sleeve gastrectomy (09/15/2015).    Family History: family history includes Alcohol abuse in his father and mother; Asthma in his mother; Cancer in his father and mother; Hypertension in his father.. Otherwise no colon cancer, inflammatory bowel disease, or GI malignancies.    Social History:  reports that he has quit smoking. He has never used smokeless tobacco.    Review of patient's allergies indicates:  No Known Allergies    Medications:   Medications Prior to Admission   Medication Sig Dispense Refill Last Dose    albuterol (PROAIR HFA) 90 mcg/actuation inhaler Inhale 2 puffs into the lungs every 6 (six) hours as needed for Wheezing.  Rescue 18 g 1 Taking    albuterol-ipratropium (DUO-NEB) 2.5 mg-0.5 mg/3 mL nebulizer solution Take 3 mLs by nebulization every 6 (six) hours as needed for Wheezing. Rescue 100 each 1 Taking    atorvastatin (LIPITOR) 80 MG tablet Take 80 mg by mouth once daily.   Taking    b complex vitamins tablet Take 1 tablet by mouth once daily.   Taking    blood sugar diagnostic Strp To check BG TID PRN, to use with OneTouch glucometer 100 each 3 Taking    carvediloL (COREG) 6.25 MG tablet Take 1 tablet (6.25 mg total) by mouth 2 (two) times a day. 180 tablet 1 Taking    cyanocobalamin (VITAMIN B-12) 100 MCG tablet Take 100 mcg by mouth once daily.   Taking    ergocalciferol (ERGOCALCIFEROL) 50,000 unit Cap Take 1 capsule (50,000 Units total) by mouth twice a week. 24 capsule 1 Taking    Lactobacillus acidophilus (PROBIOTIC) 10 billion cell Cap Take by mouth.   Taking    lancets Misc To check BG TID PRN, to use with OneTouch glucometer 100 each 3 Taking    metFORMIN (GLUCOPHAGE) 500 MG tablet Take 1 tablet (500 mg total) by mouth 2 (two) times a day. 180 tablet 3 Taking    mineral oil/petrolatum,white (LACRI-LUBE S.O.P. OPHT) Apply 1 drop to eye 4 (four) times daily as needed.   Taking    mupirocin (BACTROBAN) 2 % ointment Apply topically 3 (three) times daily. 30 g 2 Taking    omeprazole (PRILOSEC) 40 MG capsule Take 1 capsule (40 mg total) by mouth every morning. 90 capsule 3 Taking    polyethylene glycol (GLYCOLAX) 17 gram/dose powder Take 17 g by mouth once daily.   Taking    potassium chloride SA (K-DUR,KLOR-CON) 20 MEQ tablet Take 20 mEq by mouth once daily.   Taking    silver sulfADIAZINE 1% (SILVADENE) 1 % cream Apply 1 g topically 2 (two) times daily. 85 g 2 Taking    valsartan-hydrochlorothiazide (DIOVAN-HCT) 320-25 mg per tablet Take 1 tablet by mouth Daily. 90 tablet 3 Taking       Objective Findings:    Vital Signs: Per nursing notes.    Physical Exam:  General Appearance: Well appearing in no acute distress  Head:    Normocephalic, without obvious abnormality  Eyes:    No scleral icterus  Airway: Open  Neck: No restriction in mobility  Lungs: CTA bilaterally in anterior and posterior fields, no wheezes, no crackles.  Heart:  Regular rate and rhythm, S1, S2 normal, no murmurs heard  Abdomen: Soft, non tender, non distended      Labs:  Lab Results   Component Value Date    WBC 6.4 05/26/2022    HGB 13.8 (L) 05/26/2022    HCT 42.4 05/26/2022     05/26/2022    CHOL 119 05/26/2022    TRIG 78 05/26/2022    HDL 40 05/26/2022    ALT 14 05/26/2022    AST 16 05/26/2022     05/26/2022    K 4.1 05/26/2022    CREATININE 0.66 05/26/2022    BUN 12.0 05/26/2022    CO2 30 05/26/2022    TSH 1.5637 05/26/2022    PSA 0.66 05/26/2022    INR 1.0 03/07/2022    HGBA1C 5.9 05/26/2022         I have explained the risks and benefits of endoscopy procedures to the patient including but not limited to bleeding, perforation, infection, and death.    Thank you so much for allowing me to participate in the care of Stalin Mcgrath Iii, MD

## 2023-05-09 LAB — PSYCHE PATHOLOGY RESULT: NORMAL

## 2023-06-18 ENCOUNTER — PATIENT MESSAGE (OUTPATIENT)
Dept: CARDIOLOGY | Facility: CLINIC | Age: 63
End: 2023-06-18
Payer: MEDICARE

## 2024-07-12 ENCOUNTER — PATIENT MESSAGE (OUTPATIENT)
Dept: CARDIOLOGY | Facility: CLINIC | Age: 64
End: 2024-07-12
Payer: MEDICARE

## 2024-07-23 ENCOUNTER — PATIENT MESSAGE (OUTPATIENT)
Dept: CARDIOLOGY | Facility: CLINIC | Age: 64
End: 2024-07-23
Payer: MEDICARE

## 2024-09-16 PROBLEM — Z00.00 MEDICARE ANNUAL WELLNESS VISIT, SUBSEQUENT: Status: RESOLVED | Noted: 2022-05-26 | Resolved: 2024-09-16

## 2024-12-02 ENCOUNTER — PATIENT MESSAGE (OUTPATIENT)
Dept: CARDIOLOGY | Facility: CLINIC | Age: 64
End: 2024-12-02
Payer: MEDICARE

## 2025-01-08 ENCOUNTER — LAB VISIT (OUTPATIENT)
Dept: LAB | Facility: HOSPITAL | Age: 65
End: 2025-01-08
Attending: INTERNAL MEDICINE
Payer: MEDICARE

## 2025-01-08 DIAGNOSIS — E78.5 HYPERLIPIDEMIA, UNSPECIFIED HYPERLIPIDEMIA TYPE: Primary | ICD-10-CM

## 2025-01-08 LAB
ALBUMIN SERPL-MCNC: 3.7 G/DL (ref 3.4–4.8)
ALBUMIN/GLOB SERPL: 1.2 RATIO (ref 1.1–2)
ALP SERPL-CCNC: 65 UNIT/L (ref 40–150)
ALT SERPL-CCNC: 14 UNIT/L (ref 0–55)
ANION GAP SERPL CALC-SCNC: 6 MEQ/L
AST SERPL-CCNC: 16 UNIT/L (ref 5–34)
BILIRUB SERPL-MCNC: 0.8 MG/DL
BUN SERPL-MCNC: 14.5 MG/DL (ref 8.4–25.7)
CALCIUM SERPL-MCNC: 9 MG/DL (ref 8.8–10)
CHLORIDE SERPL-SCNC: 104 MMOL/L (ref 98–107)
CHOLEST SERPL-MCNC: 120 MG/DL
CHOLEST/HDLC SERPL: 3 {RATIO} (ref 0–5)
CO2 SERPL-SCNC: 31 MMOL/L (ref 23–31)
CREAT SERPL-MCNC: 0.75 MG/DL (ref 0.72–1.25)
CREAT/UREA NIT SERPL: 19
GFR SERPLBLD CREATININE-BSD FMLA CKD-EPI: >60 ML/MIN/1.73/M2
GLOBULIN SER-MCNC: 3.2 GM/DL (ref 2.4–3.5)
GLUCOSE SERPL-MCNC: 119 MG/DL (ref 82–115)
HDLC SERPL-MCNC: 41 MG/DL (ref 35–60)
LDLC SERPL CALC-MCNC: 67 MG/DL (ref 50–140)
POTASSIUM SERPL-SCNC: 4.1 MMOL/L (ref 3.5–5.1)
PROT SERPL-MCNC: 6.9 GM/DL (ref 5.8–7.6)
SODIUM SERPL-SCNC: 141 MMOL/L (ref 136–145)
TRIGL SERPL-MCNC: 62 MG/DL (ref 34–140)
VLDLC SERPL CALC-MCNC: 12 MG/DL

## 2025-01-08 PROCEDURE — 80053 COMPREHEN METABOLIC PANEL: CPT

## 2025-01-08 PROCEDURE — 80061 LIPID PANEL: CPT

## 2025-01-08 PROCEDURE — 36415 COLL VENOUS BLD VENIPUNCTURE: CPT

## 2025-08-12 PROBLEM — E66.01 MORBID (SEVERE) OBESITY DUE TO EXCESS CALORIES: Status: ACTIVE | Noted: 2025-08-12

## 2025-08-22 PROCEDURE — 87077 CULTURE AEROBIC IDENTIFY: CPT | Performed by: STUDENT IN AN ORGANIZED HEALTH CARE EDUCATION/TRAINING PROGRAM

## (undated) DEVICE — KIT CANIST SUCTION 1200CC

## (undated) DEVICE — TRAP SUCTION POLYP

## (undated) DEVICE — COLLECTION SPECIMEN NEPTUNE

## (undated) DEVICE — ADAPTER DUAL NSL LUER M-M 7FT

## (undated) DEVICE — SNARE CAPTIFLEX 2.4X27MM 240CM

## (undated) DEVICE — KIT SURGICAL COLON .25 1.1OZ

## (undated) DEVICE — TIP SUCTION YANKAUER

## (undated) DEVICE — SOL IRRI STRL WATER 1000ML

## (undated) DEVICE — CONTAINER SPECIMEN 4OZ

## (undated) DEVICE — UNDERPAD DISPOSABLE 30X30IN